# Patient Record
Sex: FEMALE | Race: WHITE | NOT HISPANIC OR LATINO | URBAN - METROPOLITAN AREA
[De-identification: names, ages, dates, MRNs, and addresses within clinical notes are randomized per-mention and may not be internally consistent; named-entity substitution may affect disease eponyms.]

---

## 2019-06-10 ENCOUNTER — APPOINTMENT (RX ONLY)
Dept: URBAN - METROPOLITAN AREA CLINIC 125 | Facility: CLINIC | Age: 39
Setting detail: DERMATOLOGY
End: 2019-06-10

## 2019-06-10 DIAGNOSIS — D22 MELANOCYTIC NEVI: ICD-10-CM

## 2019-06-10 DIAGNOSIS — L55.9 SUNBURN, UNSPECIFIED: ICD-10-CM

## 2019-06-10 DIAGNOSIS — Z71.89 OTHER SPECIFIED COUNSELING: ICD-10-CM

## 2019-06-10 DIAGNOSIS — D18.0 HEMANGIOMA: ICD-10-CM

## 2019-06-10 DIAGNOSIS — B35.1 TINEA UNGUIUM: ICD-10-CM

## 2019-06-10 DIAGNOSIS — L81.4 OTHER MELANIN HYPERPIGMENTATION: ICD-10-CM

## 2019-06-10 PROBLEM — D22.5 MELANOCYTIC NEVI OF TRUNK: Status: ACTIVE | Noted: 2019-06-10

## 2019-06-10 PROBLEM — F41.9 ANXIETY DISORDER, UNSPECIFIED: Status: ACTIVE | Noted: 2019-06-10

## 2019-06-10 PROBLEM — D18.01 HEMANGIOMA OF SKIN AND SUBCUTANEOUS TISSUE: Status: ACTIVE | Noted: 2019-06-10

## 2019-06-10 PROCEDURE — ? DIAGNOSIS COMMENT

## 2019-06-10 PROCEDURE — 99203 OFFICE O/P NEW LOW 30 MIN: CPT

## 2019-06-10 PROCEDURE — ? COUNSELING

## 2019-06-10 PROCEDURE — ? TREATMENT REGIMEN

## 2019-06-10 ASSESSMENT — LOCATION SIMPLE DESCRIPTION DERM
LOCATION SIMPLE: UPPER BACK
LOCATION SIMPLE: ABDOMEN
LOCATION SIMPLE: RIGHT LOWER BACK
LOCATION SIMPLE: RIGHT UPPER BACK
LOCATION SIMPLE: LEFT GREAT TOE

## 2019-06-10 ASSESSMENT — LOCATION DETAILED DESCRIPTION DERM
LOCATION DETAILED: SUPERIOR THORACIC SPINE
LOCATION DETAILED: RIGHT INFERIOR LATERAL MIDBACK
LOCATION DETAILED: LEFT GREAT TOENAIL
LOCATION DETAILED: EPIGASTRIC SKIN
LOCATION DETAILED: RIGHT INFERIOR UPPER BACK

## 2019-06-10 ASSESSMENT — LOCATION ZONE DERM
LOCATION ZONE: TOENAIL
LOCATION ZONE: TRUNK

## 2019-06-10 NOTE — PROCEDURE: TREATMENT REGIMEN
Samples Given: Zeabsorb AF Powder QD
Detail Level: Zone
Samples Given: Blue lizard \\nCeraVe sunscreen